# Patient Record
Sex: FEMALE
[De-identification: names, ages, dates, MRNs, and addresses within clinical notes are randomized per-mention and may not be internally consistent; named-entity substitution may affect disease eponyms.]

---

## 2024-06-26 PROBLEM — Z00.00 ENCOUNTER FOR PREVENTIVE HEALTH EXAMINATION: Status: ACTIVE | Noted: 2024-06-26

## 2024-06-27 ENCOUNTER — APPOINTMENT (OUTPATIENT)
Dept: MAMMOGRAPHY | Facility: CLINIC | Age: 49
End: 2024-06-27
Payer: MEDICAID

## 2024-06-27 ENCOUNTER — OUTPATIENT (OUTPATIENT)
Dept: OUTPATIENT SERVICES | Facility: HOSPITAL | Age: 49
LOS: 1 days | End: 2024-06-27
Payer: MEDICAID

## 2024-06-27 ENCOUNTER — APPOINTMENT (OUTPATIENT)
Dept: ULTRASOUND IMAGING | Facility: CLINIC | Age: 49
End: 2024-06-27
Payer: MEDICAID

## 2024-06-27 DIAGNOSIS — Z12.31 ENCOUNTER FOR SCREENING MAMMOGRAM FOR MALIGNANT NEOPLASM OF BREAST: ICD-10-CM

## 2024-06-27 PROCEDURE — 77067 SCR MAMMO BI INCL CAD: CPT

## 2024-06-27 PROCEDURE — 77063 BREAST TOMOSYNTHESIS BI: CPT | Mod: 26

## 2024-06-27 PROCEDURE — 76641 ULTRASOUND BREAST COMPLETE: CPT | Mod: 26,50

## 2024-06-27 PROCEDURE — 77063 BREAST TOMOSYNTHESIS BI: CPT

## 2024-06-27 PROCEDURE — 77067 SCR MAMMO BI INCL CAD: CPT | Mod: 26

## 2024-06-27 PROCEDURE — 76641 ULTRASOUND BREAST COMPLETE: CPT

## 2024-08-14 ENCOUNTER — APPOINTMENT (OUTPATIENT)
Dept: ULTRASOUND IMAGING | Facility: CLINIC | Age: 49
End: 2024-08-14
Payer: MEDICAID

## 2024-08-14 ENCOUNTER — APPOINTMENT (OUTPATIENT)
Dept: MAMMOGRAPHY | Facility: CLINIC | Age: 49
End: 2024-08-14
Payer: MEDICAID

## 2024-08-14 PROCEDURE — 76642 ULTRASOUND BREAST LIMITED: CPT | Mod: 26,RT

## 2024-08-14 PROCEDURE — 77061 BREAST TOMOSYNTHESIS UNI: CPT | Mod: 26

## 2024-08-14 PROCEDURE — 77065 DX MAMMO INCL CAD UNI: CPT | Mod: 26,LT

## 2024-09-27 ENCOUNTER — OFFICE (OUTPATIENT)
Dept: URBAN - METROPOLITAN AREA CLINIC 102 | Facility: CLINIC | Age: 49
Setting detail: OPHTHALMOLOGY
End: 2024-09-27
Payer: COMMERCIAL

## 2024-09-27 DIAGNOSIS — H16.223: ICD-10-CM

## 2024-09-27 DIAGNOSIS — H25.13: ICD-10-CM

## 2024-09-27 DIAGNOSIS — H52.4: ICD-10-CM

## 2024-09-27 PROBLEM — H52.7 REFRACTIVE ERROR: Status: ACTIVE | Noted: 2024-09-27

## 2024-09-27 PROCEDURE — 92015 DETERMINE REFRACTIVE STATE: CPT | Performed by: STUDENT IN AN ORGANIZED HEALTH CARE EDUCATION/TRAINING PROGRAM

## 2024-09-27 PROCEDURE — 92004 COMPRE OPH EXAM NEW PT 1/>: CPT | Performed by: STUDENT IN AN ORGANIZED HEALTH CARE EDUCATION/TRAINING PROGRAM

## 2024-09-27 ASSESSMENT — CONFRONTATIONAL VISUAL FIELD TEST (CVF)
OD_FINDINGS: FULL
OS_FINDINGS: FULL

## 2025-05-16 ENCOUNTER — EMERGENCY (EMERGENCY)
Facility: HOSPITAL | Age: 50
LOS: 0 days | Discharge: HOME | End: 2025-05-16
Attending: EMERGENCY MEDICINE
Payer: COMMERCIAL

## 2025-05-16 VITALS
HEART RATE: 69 BPM | WEIGHT: 120.81 LBS | DIASTOLIC BLOOD PRESSURE: 77 MMHG | RESPIRATION RATE: 18 BRPM | SYSTOLIC BLOOD PRESSURE: 178 MMHG | OXYGEN SATURATION: 95 % | TEMPERATURE: 98 F

## 2025-05-16 VITALS
SYSTOLIC BLOOD PRESSURE: 162 MMHG | HEART RATE: 62 BPM | OXYGEN SATURATION: 98 % | TEMPERATURE: 99 F | RESPIRATION RATE: 16 BRPM | DIASTOLIC BLOOD PRESSURE: 84 MMHG

## 2025-05-16 DIAGNOSIS — Z20.1 CONTACT WITH AND (SUSPECTED) EXPOSURE TO TUBERCULOSIS: ICD-10-CM

## 2025-05-16 PROCEDURE — 99284 EMERGENCY DEPT VISIT MOD MDM: CPT

## 2025-05-16 PROCEDURE — 71046 X-RAY EXAM CHEST 2 VIEWS: CPT

## 2025-05-16 PROCEDURE — 36415 COLL VENOUS BLD VENIPUNCTURE: CPT

## 2025-05-16 PROCEDURE — 86480 TB TEST CELL IMMUN MEASURE: CPT

## 2025-05-16 PROCEDURE — 99283 EMERGENCY DEPT VISIT LOW MDM: CPT | Mod: 25

## 2025-05-16 PROCEDURE — 71046 X-RAY EXAM CHEST 2 VIEWS: CPT | Mod: 26

## 2025-05-16 NOTE — ED STATDOCS - PROGRESS NOTE DETAILS
History with assistance from  ID# [id number]. 50 y/o F with PMH of HTN presents with TB exposure 1 month ago. Was contacted by her friend that he tested positive and was advised to get tested. She denies having fever, chills, SOB, cough, night sweats, unintended weight loss. States she has been visiting this friend apx 1x per week for the past few months. PE: Well appearing. Cardiac: s1s2, RRR. lungs: CTAB. Abdomen: NBS x4 soft, nontender. A/P: TB exposure, d/w Dr. Hendricks, advised CXR and Quantiferon gold. If CXR displays abnormality, recommended admission. Otherwise DC with outpatient follow up. - Barrett Multani PA-C

## 2025-05-16 NOTE — ED STATDOCS - NSFOLLOWUPINSTRUCTIONS_ED_ALL_ED_FT
Tuberculosis  Tuberculosis  La tuberculosis (TB) es mikala infección que normalmente afecta los pulmones mylene que también puede afectar otras partes del cuerpo. La causa mikala bacteria. Hay dos formas de TB:  Tuberculosis activa, también denominada enfermedad de TB. Linda significa que usted tiene síntomas de TB y que la infección puede transmitirse a otras personas (usted contagia).  Tuberculosis latente. Significa que no tiene ningún síntoma de tuberculosis y no contagia la enfermedad.  La TB latente puede convertirse en TB activa, de modo que es importante recibir tratamiento más allá del tipo de TB que tenga.    ¿Cuáles son las causas?  La causa de la TB es mikala bacteria llamada Mycobacterium tuberculosis. Usted puede contagiarse esta bacteria respirando gotas de tos o estornudo de mikala persona que tiene TB activa.    ¿Qué incrementa el riesgo?  Es más probable que desarrolle TB si:  Tiene el virus de inmunodeficiencia humana (VIH) o sida.  Tiene diabetes.  Es de edad avanzada. Los niños también son propensos a contagiarse la TB.  Consume drogas.  Consume tabaco.  Vive o viaja a lugares donde hay altos índices de TB, lazara por ejemplo:  [place].  [place].  [place].  [place].  [place].  [place].  [place].  Vive o trabaja en áreas que pueden estar superpobladas o con flujo de aire (ventilación) deficiente, lo que incluye:  Establecimientos de atención médica.  Establecimientos residenciales con cuidados sanitarios, lazara centros de vivienda asistida.  Sanders para refugiados o refugios para personas en situación de bobo.  ¿Cuáles son los signos o síntomas?  Los síntomas de esta afección incluyen:  Toser pari, mucosidad proveniente de los pulmones (esputo), o ambas cosas.  Mikala tos que dura an semanas o más.  Dolor en el pecho o dolor al respirar o toser.  Pérdida del apetito o pérdida de peso sin explicación.  Cansancio (fatiga) y debilidad.  Fiebre, transpiración y escalofríos.  ¿Cómo se diagnostica?  Esta afección se puede diagnosticar en función de un examen físico, ronda síntomas y ronda antecedentes médicos. Es posible que le tomen radiografías de tórax. También es posible que se obtengan mikala o más de las siguientes muestras y que se analicen para detectar la bacteria:  Piel.  Pari.  Esputo.  Orina.  ¿Cómo se trata?  Tanto la TB latente lazara la activa se tratan con medicamentos antibióticos. Es posible que tenga que maude antibióticos cisco 6 a 9 meses.    Siga estas instrucciones en villafuerte casa:  Medicamentos    Use los medicamentos recetados y de venta nadine lazara se lo haya indicado el médico. Termine todo el antibiótico aunque comience a sentirse mejor.  Actividad    Descanse todo lo que sea necesario. Pregúntele al médico qué actividades son seguras para usted.  No regrese al trabajo o a la escuela hasta que el médico lo autorice.  Evite el contacto cercano con otras personas (especialmente con bebés y personas de edad avanzada) hasta que el médico le diga que ya no contagiará la TB.  Instrucciones generales    A person covering her mouth and nose with a cloth while sneezing or coughing.  Washing hands with soap and water.  Informe al médico sobre todas las personas que viven con usted o con las que tiene contacto cercano. Esas personas deben hacerse la prueba de TB.  No consuma ningún producto que contenga nicotina o tabaco. Estos productos incluyen cigarrillos, tabaco para mascar y aparatos de vapeo, lazara los cigarrillos electrónicos. Si necesita ayuda para dejar de fumar, consulte al médico.  Al toser o estornudar, cúbrase la boca y la nariz. Deseche los pañuelos descartables usados lazara se lo haya dicho el médico.  Lávese las dagoberto frecuentemente con agua y jabón. Use desinfectante para dagoberto si no dispone de agua y jabón.  Las pruebas de TB posteriores requerirán análisis de pari o radiografías de tórax para determinar la presencia de tuberculosis activa. No se alexy la prueba cutánea de TB después de chana tenido o hcana estado expuesto a la tuberculosis.  Concurra a todas las visitas de seguimiento. Linda es importante.  Comuníquese con un médico si:  Aparecen nuevos síntomas.  Pierde el apetito.  Siente náuseas o vomita.  Orina de color amarillo oscuro.  La piel o la parte jeni de los ojos se torna de color amarillento (ictericia).  Los síntomas empeoran o no desaparecen con el tratamiento.  Tiene fiebre.  Solicite ayuda de inmediato si:  Siente dolor en el pecho.  Tose y escupe pari.  Tiene dificultad para respirar o le falta el aire.  Siente dolor de zoey o tiene el sandra rígido.  Resumen  La tuberculosis (TB) es mikala infección que normalmente afecta los pulmones mylene que también puede afectar otras partes del cuerpo. La causa mikala bacteria.  Las dos formas de TB son la TB activa y la TB latente. Si tiene TB activa, la infección puede transmitirse a otra persona (usted contagia).  La TB latente puede convertirse en TB activa, de modo que es importante recibir tratamiento más allá del tipo de TB que tenga.  La TB se trata con antibióticos. Es posible que tenga que maude antibióticos cisco 6 a 9 meses.  Esta información no tiene lazara fin reemplazar el consejo del médico. Asegúrese de hacerle al médico cualquier pregunta que tenga.

## 2025-05-16 NOTE — ED STATDOCS - PATIENT PORTAL LINK FT
You can access the FollowMyHealth Patient Portal offered by [facility] by registering at the following website: [email or web address]. By joining [facility]’s FollowMyHealth portal, you will also be able to view your health information using other applications (apps) compatible with our system.

## 2025-05-16 NOTE — ED STATDOCS - CLINICAL SUMMARY MEDICAL DECISION MAKING FREE TEXT BOX
#[id number].  49-year-old female no medical issues presents to the emergency department for TB testing.  Patient states that her primary care doctor told her to come in to get tested for TB.  States that she was exposed to a friend who had TB last exposure was 1 month ago.  Patient states she is asymptomatic no fevers no night sweats no weight loss no cough no chest pain or shortness of breath no other symptoms.  Spoke with infectious disease specialist Dr. Thomas States that patient should have screening chest x-ray as well as gold quant test if the chest x-ray is negative then okay to discharge with follow-up blood test.  If chest x-ray shows infiltrate then will need to be admitted for further TB workup.  Will x-ray blood test reassess.

## 2025-05-16 NOTE — ED STATDOCS - ATTENDING APP SHARED VISIT CONTRIBUTION OF CARE
I, Ruben River MD, personally saw the patient with ACP.  I have personally performed a face to face diagnostic evaluation on this patient.   The initial assessment was performed by myself and then the patient was handed off to the ACP. The patient was followed and re-evaluated by the ACP. All labs, imaging and procedures were evaluated and performed by the ACP and I was available for consultation if any questions in the patients care came up.   I personally made/approved the management plan and take responsibility for the patient management.

## 2025-05-16 NOTE — ED ADULT NURSE REASSESSMENT NOTE - NS ED NURSE REASSESS COMMENT FT1
Contacted infection control, Mai Donovan. As per infection control, pt does not require negative pressure rooms due to being asymptomatic.

## 2025-05-16 NOTE — ED STATDOCS - OBJECTIVE STATEMENT
#[id number].  49-year-old female no medical issues presents to the emergency department for TB testing.  Patient states that her primary care doctor told her to come in to get tested for TB.  States that she was exposed to a friend who had TB last exposure was 1 month ago.  Patient states she is asymptomatic no fevers no night sweats no weight loss no cough no chest pain or shortness of breath no other symptoms.

## 2025-05-16 NOTE — ED ADULT TRIAGE NOTE - CHIEF COMPLAINT QUOTE
Pt presents to ER to be tested for TB after having exposure to TB pt 3 months ago. Pt reports 3 months ago she came in contact with a friend that has TB and received a call today that she was exposed and should get tested. Denies any symptoms.

## 2025-05-19 LAB
GAMMA INTERFERON BACKGROUND BLD IA-ACNC: 0.17 IU/ML — SIGNIFICANT CHANGE UP
M TB IFN-G BLD-IMP: NEGATIVE — SIGNIFICANT CHANGE UP
M TB IFN-G CD4+ BCKGRND COR BLD-ACNC: 0 IU/ML — SIGNIFICANT CHANGE UP
M TB IFN-G CD4+CD8+ BCKGRND COR BLD-ACNC: 0.01 IU/ML — SIGNIFICANT CHANGE UP
QUANT TB PLUS MITOGEN MINUS NIL: 4.7 IU/ML — SIGNIFICANT CHANGE UP